# Patient Record
Sex: MALE | Race: BLACK OR AFRICAN AMERICAN | Employment: UNEMPLOYED | URBAN - METROPOLITAN AREA
[De-identification: names, ages, dates, MRNs, and addresses within clinical notes are randomized per-mention and may not be internally consistent; named-entity substitution may affect disease eponyms.]

---

## 2021-03-18 ENCOUNTER — APPOINTMENT (OUTPATIENT)
Dept: CT IMAGING | Age: 41
End: 2021-03-18
Payer: MEDICAID

## 2021-03-18 ENCOUNTER — HOSPITAL ENCOUNTER (OUTPATIENT)
Age: 41
Setting detail: OBSERVATION
Discharge: LEFT BEFORE DISCHARGE | End: 2021-03-19
Attending: STUDENT IN AN ORGANIZED HEALTH CARE EDUCATION/TRAINING PROGRAM | Admitting: INTERNAL MEDICINE
Payer: MEDICAID

## 2021-03-18 ENCOUNTER — APPOINTMENT (OUTPATIENT)
Dept: GENERAL RADIOLOGY | Age: 41
End: 2021-03-18
Payer: MEDICAID

## 2021-03-18 DIAGNOSIS — R20.2 ARM PARESTHESIA, LEFT: Primary | ICD-10-CM

## 2021-03-18 DIAGNOSIS — R42 DIZZINESS: ICD-10-CM

## 2021-03-18 PROBLEM — I20.8 ANGINAL EQUIVALENT (HCC): Status: ACTIVE | Noted: 2021-03-18

## 2021-03-18 LAB
A/G RATIO: 1.4 (ref 1.1–2.2)
ACETAMINOPHEN LEVEL: <5 UG/ML (ref 10–30)
ALBUMIN SERPL-MCNC: 4.3 G/DL (ref 3.4–5)
ALP BLD-CCNC: 55 U/L (ref 40–129)
ALT SERPL-CCNC: 23 U/L (ref 10–40)
AMPHETAMINE SCREEN, URINE: ABNORMAL
ANION GAP SERPL CALCULATED.3IONS-SCNC: 11 MMOL/L (ref 3–16)
AST SERPL-CCNC: 32 U/L (ref 15–37)
BARBITURATE SCREEN URINE: ABNORMAL
BASOPHILS ABSOLUTE: 0.1 K/UL (ref 0–0.2)
BASOPHILS RELATIVE PERCENT: 1 %
BENZODIAZEPINE SCREEN, URINE: ABNORMAL
BILIRUB SERPL-MCNC: 0.5 MG/DL (ref 0–1)
BILIRUBIN URINE: NEGATIVE
BLOOD, URINE: NEGATIVE
BUN BLDV-MCNC: 9 MG/DL (ref 7–20)
CALCIUM SERPL-MCNC: 8.9 MG/DL (ref 8.3–10.6)
CANNABINOID SCREEN URINE: POSITIVE
CHLORIDE BLD-SCNC: 102 MMOL/L (ref 99–110)
CLARITY: CLEAR
CO2: 23 MMOL/L (ref 21–32)
COCAINE METABOLITE SCREEN URINE: ABNORMAL
COLOR: YELLOW
CREAT SERPL-MCNC: 0.8 MG/DL (ref 0.9–1.3)
EOSINOPHILS ABSOLUTE: 0.1 K/UL (ref 0–0.6)
EOSINOPHILS RELATIVE PERCENT: 0.9 %
ETHANOL: NORMAL MG/DL (ref 0–0.08)
GFR AFRICAN AMERICAN: >60
GFR NON-AFRICAN AMERICAN: >60
GLOBULIN: 3.1 G/DL
GLUCOSE BLD-MCNC: 129 MG/DL (ref 70–99)
GLUCOSE BLD-MCNC: 143 MG/DL (ref 70–99)
GLUCOSE URINE: NEGATIVE MG/DL
HCT VFR BLD CALC: 42.7 % (ref 40.5–52.5)
HEMOGLOBIN: 14.4 G/DL (ref 13.5–17.5)
KETONES, URINE: NEGATIVE MG/DL
LACTIC ACID: 1.7 MMOL/L (ref 0.4–2)
LEUKOCYTE ESTERASE, URINE: NEGATIVE
LYMPHOCYTES ABSOLUTE: 3.2 K/UL (ref 1–5.1)
LYMPHOCYTES RELATIVE PERCENT: 24.1 %
Lab: ABNORMAL
MCH RBC QN AUTO: 34.1 PG (ref 26–34)
MCHC RBC AUTO-ENTMCNC: 33.7 G/DL (ref 31–36)
MCV RBC AUTO: 101.3 FL (ref 80–100)
METHADONE SCREEN, URINE: ABNORMAL
MICROSCOPIC EXAMINATION: NORMAL
MONOCYTES ABSOLUTE: 0.8 K/UL (ref 0–1.3)
MONOCYTES RELATIVE PERCENT: 6.4 %
NEUTROPHILS ABSOLUTE: 8.9 K/UL (ref 1.7–7.7)
NEUTROPHILS RELATIVE PERCENT: 67.6 %
NITRITE, URINE: NEGATIVE
OPIATE SCREEN URINE: ABNORMAL
OXYCODONE URINE: ABNORMAL
PDW BLD-RTO: 13 % (ref 12.4–15.4)
PERFORMED ON: ABNORMAL
PH UA: 7
PH UA: 7 (ref 5–8)
PHENCYCLIDINE SCREEN URINE: ABNORMAL
PLATELET # BLD: 354 K/UL (ref 135–450)
PMV BLD AUTO: 7 FL (ref 5–10.5)
POTASSIUM REFLEX MAGNESIUM: 3.8 MMOL/L (ref 3.5–5.1)
PROPOXYPHENE SCREEN: ABNORMAL
PROTEIN UA: NEGATIVE MG/DL
RBC # BLD: 4.21 M/UL (ref 4.2–5.9)
SALICYLATE, SERUM: <0.3 MG/DL (ref 15–30)
SARS-COV-2, NAAT: NOT DETECTED
SODIUM BLD-SCNC: 136 MMOL/L (ref 136–145)
SPECIFIC GRAVITY UA: >1.03 (ref 1–1.03)
TOTAL PROTEIN: 7.4 G/DL (ref 6.4–8.2)
TROPONIN: <0.01 NG/ML
URINE REFLEX TO CULTURE: NORMAL
URINE TYPE: NORMAL
UROBILINOGEN, URINE: 0.2 E.U./DL
WBC # BLD: 13.2 K/UL (ref 4–11)

## 2021-03-18 PROCEDURE — 71045 X-RAY EXAM CHEST 1 VIEW: CPT

## 2021-03-18 PROCEDURE — 70450 CT HEAD/BRAIN W/O DYE: CPT

## 2021-03-18 PROCEDURE — 99283 EMERGENCY DEPT VISIT LOW MDM: CPT

## 2021-03-18 PROCEDURE — 6360000004 HC RX CONTRAST MEDICATION: Performed by: PHYSICIAN ASSISTANT

## 2021-03-18 PROCEDURE — 70496 CT ANGIOGRAPHY HEAD: CPT

## 2021-03-18 PROCEDURE — 93005 ELECTROCARDIOGRAM TRACING: CPT | Performed by: PHYSICIAN ASSISTANT

## 2021-03-18 PROCEDURE — 83605 ASSAY OF LACTIC ACID: CPT

## 2021-03-18 PROCEDURE — G0378 HOSPITAL OBSERVATION PER HR: HCPCS

## 2021-03-18 PROCEDURE — 80179 DRUG ASSAY SALICYLATE: CPT

## 2021-03-18 PROCEDURE — 80143 DRUG ASSAY ACETAMINOPHEN: CPT

## 2021-03-18 PROCEDURE — 87635 SARS-COV-2 COVID-19 AMP PRB: CPT

## 2021-03-18 PROCEDURE — 85025 COMPLETE CBC W/AUTO DIFF WBC: CPT

## 2021-03-18 PROCEDURE — 82077 ASSAY SPEC XCP UR&BREATH IA: CPT

## 2021-03-18 PROCEDURE — 2580000003 HC RX 258: Performed by: INTERNAL MEDICINE

## 2021-03-18 PROCEDURE — 80307 DRUG TEST PRSMV CHEM ANLYZR: CPT

## 2021-03-18 PROCEDURE — 81003 URINALYSIS AUTO W/O SCOPE: CPT

## 2021-03-18 PROCEDURE — 80053 COMPREHEN METABOLIC PANEL: CPT

## 2021-03-18 PROCEDURE — 6370000000 HC RX 637 (ALT 250 FOR IP): Performed by: PHYSICIAN ASSISTANT

## 2021-03-18 PROCEDURE — 84484 ASSAY OF TROPONIN QUANT: CPT

## 2021-03-18 PROCEDURE — 6370000000 HC RX 637 (ALT 250 FOR IP): Performed by: INTERNAL MEDICINE

## 2021-03-18 RX ORDER — ASPIRIN 325 MG
325 TABLET ORAL ONCE
Status: COMPLETED | OUTPATIENT
Start: 2021-03-18 | End: 2021-03-18

## 2021-03-18 RX ORDER — ASPIRIN 81 MG/1
81 TABLET, CHEWABLE ORAL DAILY
Status: DISCONTINUED | OUTPATIENT
Start: 2021-03-19 | End: 2021-03-19 | Stop reason: HOSPADM

## 2021-03-18 RX ORDER — PROMETHAZINE HYDROCHLORIDE 25 MG/1
12.5 TABLET ORAL EVERY 6 HOURS PRN
Status: DISCONTINUED | OUTPATIENT
Start: 2021-03-18 | End: 2021-03-19 | Stop reason: HOSPADM

## 2021-03-18 RX ORDER — SODIUM CHLORIDE 0.9 % (FLUSH) 0.9 %
10 SYRINGE (ML) INJECTION PRN
Status: DISCONTINUED | OUTPATIENT
Start: 2021-03-18 | End: 2021-03-19 | Stop reason: HOSPADM

## 2021-03-18 RX ORDER — ATORVASTATIN CALCIUM 40 MG/1
40 TABLET, FILM COATED ORAL NIGHTLY
Status: DISCONTINUED | OUTPATIENT
Start: 2021-03-18 | End: 2021-03-19 | Stop reason: HOSPADM

## 2021-03-18 RX ORDER — POTASSIUM CHLORIDE 7.45 MG/ML
10 INJECTION INTRAVENOUS PRN
Status: DISCONTINUED | OUTPATIENT
Start: 2021-03-18 | End: 2021-03-19 | Stop reason: HOSPADM

## 2021-03-18 RX ORDER — MAGNESIUM SULFATE IN WATER 40 MG/ML
2000 INJECTION, SOLUTION INTRAVENOUS PRN
Status: DISCONTINUED | OUTPATIENT
Start: 2021-03-18 | End: 2021-03-19 | Stop reason: HOSPADM

## 2021-03-18 RX ORDER — SODIUM CHLORIDE 0.9 % (FLUSH) 0.9 %
10 SYRINGE (ML) INJECTION EVERY 12 HOURS SCHEDULED
Status: DISCONTINUED | OUTPATIENT
Start: 2021-03-18 | End: 2021-03-19 | Stop reason: HOSPADM

## 2021-03-18 RX ORDER — ONDANSETRON 2 MG/ML
4 INJECTION INTRAMUSCULAR; INTRAVENOUS EVERY 6 HOURS PRN
Status: DISCONTINUED | OUTPATIENT
Start: 2021-03-18 | End: 2021-03-19 | Stop reason: HOSPADM

## 2021-03-18 RX ADMIN — Medication 10 ML: at 20:58

## 2021-03-18 RX ADMIN — ASPIRIN 325 MG ORAL TABLET 325 MG: 325 PILL ORAL at 20:07

## 2021-03-18 RX ADMIN — IOPAMIDOL 75 ML: 755 INJECTION, SOLUTION INTRAVENOUS at 15:37

## 2021-03-18 RX ADMIN — ATORVASTATIN CALCIUM 40 MG: 40 TABLET, FILM COATED ORAL at 20:58

## 2021-03-18 ASSESSMENT — PAIN SCALES - GENERAL: PAINLEVEL_OUTOF10: 0

## 2021-03-18 NOTE — PROGRESS NOTES
Medication Reconciliation     List of medications patient is currently taking is complete. Source of information:   1. Conversation with patient at bedside  2. EPIC records        Notes regarding home medications:  1. Patient denies taking any RX, OTC, and herbal medications.     Otilia Apgar, Pharmacy Intern

## 2021-03-18 NOTE — ED NOTES
Called Stroke Team [phone] at 1523; Dr. Cinthia Martinez called back at [facility]2 [facility]  03/18/21 8963

## 2021-03-18 NOTE — ED PROVIDER NOTES
[address]       Pt Name: Donna Mcconnell  MRN: [id number]  [place] 1980  Date of evaluation: 3/18/2021  Provider: EMILEE Cobb    Shared Visit or Autonomous Visit:  I have seen and evaluated this patient with my supervising physician Jone Merino MD.      CHIEF COMPLAINT       Chief Complaint   Patient presents with    Extremity Weakness     left sided, pt c/o \"it feels crazy\" x 1 hour       HISTORY OF PRESENT ILLNESS  (Location/Symptom, Timing/Onset, Context/Setting, Quality, Duration, Modifying Factors, Severity.)   Donna Mcconnell is a 38 y.o. male who presents to the emergency department with complaints of dizziness and tingling sensation throughout the left arm. Patient says the symptoms began spontaneously about an hour before arrival in the emergency department. Says was a normal day before then, no traumatic injury. Admits to smoking marijuana earlier today but no other drug use. He says he can still feel things in his left arm, but it is tingling throughout, with diminished sensation. Denies symptoms in the left leg or in the right arm or leg. Says the dizziness is primarily when he gets up and tries to move, as he feels unbalanced, a little lightheaded. Denies any prior history of symptoms like this or any known medical problems. Denies any recent trauma. He says he is moving all the limbs normally. Denies confusion. No other complaints. Nursing Notes were reviewed and I agree. REVIEW OF SYSTEMS    (2-9 systems for level 4, 10 or more for level 5)     Constitutional:  Negative for fever, chills, unexpected weight change. HENT:  Negative for congestion, rhinorrhea, sneezing, sore throat, trouble swallowing. Eyes:  Negative for pain or visual disturbance. Respiratory:  Negative for cough, shortness of breath, wheezing, stridor. Cardiovascular: Positive for lightheadedness.   Negative for chest pain, palpitations, leg swelling. Gastrointestinal:  Negative for nausea, vomiting, abdominal pain, diarrhea, constipation, blood in stool. Genitourinary:  Negative for dysuria, hematuria, flank pain, groin pain. Musculoskeletal:  Negative for myalgias, arthralgias, neck pain or stiffness. Neurological: Positive for diminished sensation in the left arm, dizziness. Negative for seizures, syncope, focal weakness, numbness, headache. Psychiatric/Behavioral:  Negative for suicidal ideas, hallucinations, confusion. Except as noted above the remainder of the review of systems was reviewed and negative. PAST MEDICAL HISTORY         Diagnosis Date    Foot pain, right        SURGICAL HISTORY           Procedure Laterality Date    OTHER SURGICAL HISTORY Right 09/15/2016    LAPIDUS BUNIONECTOMY RIGHT FOOT              CURRENT MEDICATIONS       Previous Medications    No medications on file       ALLERGIES     Patient has no known allergies. FAMILY HISTORY     History reviewed. No pertinent family history. No family status information on file. SOCIAL HISTORY      reports that he has been smoking. He has never used smokeless tobacco. He reports that he does not drink alcohol or use drugs. PHYSICAL EXAM    (up to 7 for level 4, 8 or more for level 5)     ED Triage Vitals   BP Temp Temp Source Pulse Resp SpO2 Height Weight   03/18/21 1521 03/18/21 1549 03/18/21 1549 03/18/21 1521 03/18/21 1521 03/18/21 1521 -- 03/18/21 1549   (!) 154/86 98.3 °F (36.8 °C) Oral 54 20 92 %  198 lb 3.1 oz (89.9 kg)       Constitutional:  Appearing well-developed and well-nourished. No distress. HENT:  Normocephalic and atraumatic. Conjunctivae and EOM normal. PERRLA. Neck:  Normal range of motion. No tracheal deviation. Cardiovascular:  Normal rate, regular rhythm, normal heart sounds, intact distal pulses. Pulmonary/Chest:  Effort and breath sounds normal. No respiratory distress, wheezes or rales. Abdominal:  Soft.  Bowel sounds normal. No tenderness, distension, mass, rebound or guarding. Musculoskeletal:  Normal range of motion. No edema exhibited. Neurological:  Alert and oriented to person, place, and time. PERRLA. EOM normal bilaterally. Cranial facial musculature and sensation grossly intact. Finger to nose testing intact. Normal test of skew. Negative pronator drift. 5/5 strength bilaterally in upper extremities with resisted shoulder abduction and flexion. No truncal ataxia. Negative Romberg. Normal gait. Skin:  Skin is warm and dry. Notably diaphoretic. Psychiatric:  Normal mood, affect, behavior, judgment, thought content. DIAGNOSTIC RESULTS     RADIOLOGY:     Interpretation per the Radiologist below, if available at the time of this note:    XR CHEST PORTABLE   Final Result   No acute cardiopulmonary disease. CTA HEAD NECK W CONTRAST   Final Result   Unremarkable CTA of the head and neck. CT Head WO Contrast   Final Result   No acute intracranial abnormality.              LABS:  Labs Reviewed   CBC WITH AUTO DIFFERENTIAL - Abnormal; Notable for the following components:       Result Value    WBC 13.2 (*)     .3 (*)     MCH 34.1 (*)     Neutrophils Absolute 8.9 (*)     All other components within normal limits    Narrative:     Performed at:  [facility]  [address] [address]   Phone [phone]   COMPREHENSIVE METABOLIC PANEL W/ REFLEX TO MG FOR LOW K - Abnormal; Notable for the following components:    Glucose 129 (*)     CREATININE 0.8 (*)     All other components within normal limits    Narrative:     Performed at:  [facility]  [address] [place], [place] [address]   Phone [phone]   URINE DRUG SCREEN - Abnormal; Notable for the following components:    Cannabinoid Scrn, Ur POSITIVE (*)     All other components within normal limits    Narrative:     Performed at:  [facility]  [address] [facility] [place] [address]   Phone [phone]   ACETAMINOPHEN LEVEL - Abnormal; Notable for the following components:    Acetaminophen Level <5 (*)     All other components within normal limits    Narrative:     Performed at:  [facility]  [address], [place] [address]   Phone [phone]   SALICYLATE LEVEL - Abnormal; Notable for the following components:    Salicylate, Serum <8.1 (*)     All other components within normal limits    Narrative:     Performed at:  [facility]  [address] [place] [address]   Phone [phone]   POCT GLUCOSE - Abnormal; Notable for the following components:    POC Glucose 143 (*)     All other components within normal limits    Narrative:     Performed at:  [facility]  [address], [place] [address]   Phone [phone], RAPID    Narrative:     Performed at:  [facility]  [address], [facility] [place] [address]   Phone [phone]   URINE RT REFLEX TO CULTURE    Narrative:     Performed at:  [facility]  [address], [facility] [place] [address]   Phone [phone]   LACTIC ACID, PLASMA    Narrative:     Performed at:  [facility]  [address], [facility] [place] [address]   Phone [phone]   TROPONIN    Narrative:     Performed at:  [facility]  [address], [facility] [place] [address]   Phone [phone]   ETHANOL    Narrative:     Performed at:  [facility]  [facility] [address], [facility] [place] [address]   Phone [phone]       All other labs were within normal range or not returned as of this dictation.     EMERGENCY DEPARTMENT COURSE and DIFFERENTIAL DIAGNOSIS/MDM:   Vitals:    Vitals:    03/18/21 1800 03/18/21 1900 03/18/21 1915 03/18/21 1930   BP: (!) 161/127 (!) 145/103 (!) 160/98 (!) 154/102   Pulse: 61 56 57 58   Resp: 21 20 21 15   Temp:       TempSrc:       SpO2: 95% 98% 99% 100%   Weight:           The patient's condition in the ED was guarded. He was afebrile and nontoxic in appearance, but plainly diaphoretic upon presentation. Did not appear confused. Initial neurologic exam was normal other than for reported diminished sensation in the left arm. There was normal movement. Stroke alert was activated. I spoke with the Dr. Fidelia Boo, of the [place] stroke team, who advised that the patient be ambulated. While getting a CT scan, the patient vomited. I then ambulated him, and he was able to stand and take steps independently with no loss of balance. I reported this back to Dr. Jairon Chamorro, who was suspicious for an ischemic event but not disabling 1, and advised no TPA treatment. I received a call from radiology, reporting that initial CT of the head without contrast was normal.  Initial EKG showed a prolonged QT interval but no other concerning abnormalities. Patient was hypertensive in the ED, pressures as high as 154/102. ED work-up otherwise unremarkable. Patient is in need of hospital admission. I consulted the hospitalist, who agreed to accept and admit the patient. CRITICAL CARE: There was a high probability of clinically significant/life threatening deterioration in this patient's condition, which required my urgent intervention. Total critical care time was 40 minutes. This excludes any time for separately reportable procedures. PROCEDURES:  None    FINAL IMPRESSION      1. Arm paresthesia, left    2. Dizziness          DISPOSITION/PLAN   DISPOSITION Admitted 03/18/2021 07:48:40 PM      PATIENT REFERRED TO:  No follow-up provider specified.     DISCHARGE MEDICATIONS:  New Prescriptions    No medications on file       (Please note that portions of this note were completed with a voice recognition program.  Efforts were made to edit the dictations but occasionally words are mis-transcribed.)    Hemanth Collins, [address] [place] [address],[address] [place] [address]  03/18/21 2002

## 2021-03-18 NOTE — CONSULTS
Brief Stroke Phone Consultation:     I was contacted by the ED re: . Tino Rockwell. Briefly, Tamica Singh is a 38 y.o. male who presented with numbness to left arm and dizziness about one hour prior to arrival to the ED. While in the CT scanner, he vomited as well. In discussion with the PA,  Esteban Cook, the patient was able to ambulate without assistance. He had no dysphagia. NO disabling deficits at this time. Although he may be having an ischemic event, the deficits described are non disabling for now, tPA is not recommended. His CTA is negative for LVO, so endovascular therapy would not be indicated. Please call back with any questions or concerns.      Yanely Delcid MD MS   Stroke Team

## 2021-03-19 VITALS
BODY MASS INDEX: 30.75 KG/M2 | DIASTOLIC BLOOD PRESSURE: 92 MMHG | WEIGHT: 191.36 LBS | SYSTOLIC BLOOD PRESSURE: 152 MMHG | HEART RATE: 67 BPM | HEIGHT: 66 IN | TEMPERATURE: 98.9 F | RESPIRATION RATE: 16 BRPM | OXYGEN SATURATION: 100 %

## 2021-03-19 LAB
EKG ATRIAL RATE: 58 BPM
EKG ATRIAL RATE: 64 BPM
EKG DIAGNOSIS: NORMAL
EKG DIAGNOSIS: NORMAL
EKG P AXIS: 39 DEGREES
EKG P AXIS: 44 DEGREES
EKG P-R INTERVAL: 162 MS
EKG P-R INTERVAL: 180 MS
EKG Q-T INTERVAL: 430 MS
EKG Q-T INTERVAL: 474 MS
EKG QRS DURATION: 102 MS
EKG QRS DURATION: 108 MS
EKG QTC CALCULATION (BAZETT): 443 MS
EKG QTC CALCULATION (BAZETT): 465 MS
EKG R AXIS: -10 DEGREES
EKG R AXIS: -19 DEGREES
EKG T AXIS: 5 DEGREES
EKG T AXIS: 7 DEGREES
EKG VENTRICULAR RATE: 58 BPM
EKG VENTRICULAR RATE: 64 BPM
HCT VFR BLD CALC: 43.8 % (ref 40.5–52.5)
HEMOGLOBIN: 14.9 G/DL (ref 13.5–17.5)
MCH RBC QN AUTO: 34.3 PG (ref 26–34)
MCHC RBC AUTO-ENTMCNC: 34.1 G/DL (ref 31–36)
MCV RBC AUTO: 100.7 FL (ref 80–100)
PDW BLD-RTO: 13 % (ref 12.4–15.4)
PLATELET # BLD: 346 K/UL (ref 135–450)
PMV BLD AUTO: 6.8 FL (ref 5–10.5)
RBC # BLD: 4.35 M/UL (ref 4.2–5.9)
TROPONIN: <0.01 NG/ML
WBC # BLD: 11.4 K/UL (ref 4–11)

## 2021-03-19 PROCEDURE — 36415 COLL VENOUS BLD VENIPUNCTURE: CPT

## 2021-03-19 PROCEDURE — 85027 COMPLETE CBC AUTOMATED: CPT

## 2021-03-19 PROCEDURE — 93010 ELECTROCARDIOGRAM REPORT: CPT | Performed by: INTERNAL MEDICINE

## 2021-03-19 PROCEDURE — G0378 HOSPITAL OBSERVATION PER HR: HCPCS

## 2021-03-19 PROCEDURE — 84484 ASSAY OF TROPONIN QUANT: CPT

## 2021-03-19 PROCEDURE — 93005 ELECTROCARDIOGRAM TRACING: CPT | Performed by: INTERNAL MEDICINE

## 2021-03-19 NOTE — PROGRESS NOTES
Pt asked nurse to reach out to cardiology and testing to find out when he would be seen. Echo cannot be done until after noon, cardiology will not see him till afternoon. Explained to patient. Pt left AMA, for personal reasons at home. Pt states \"I have to leave here, I feel fine. \" explained to pt that AMA might result in insurance not covering bill. Pt verbalized understanding. Instructed pt to come back to emergency department if symptoms continue. Pt verbalized understanding. Provided patient with phone number to [facility] per Pawan Meza RN recommendation. PIV removed. Dressing in place. Pt walked to exit.

## 2021-03-19 NOTE — PROGRESS NOTES
H/p dictation id [id number]. Date of service 03/18/2021. Anginal equivalent vs tia. Obesity. Htn. Marijuana use.

## 2021-03-19 NOTE — H&P
is 10, pulse 61,  blood pressure 163/96, saturating 98%. CNS:  Alert, awake and oriented. PSYCH:  The patient is cooperative, answering questions appropriately. HEENT:  Eyes:  Pupils are reactive to light. ENT:  Extraocular muscle  movements are intact. RESPIRATORY:  No obvious rales, rubs, or rhonchi. CARDIOVASCULAR:  S1, S2 are heard. No murmurs, gallops, or rubs. ABDOMEN:  Nondistended. MUSCULOSKELETAL:  No acute obvious deformities. SKIN:  Appears hydrated without any obvious rashes or lesions. DIAGNOSTIC DATA:  Urine drug screen is positive for cannabis. Troponin  less than 0.01 x2. BUN 9, creatinine 0.8. Alcohol level not detected. White count of 13.2, hemoglobin of 14.4. CT of the head and neck showed unremarkable CTA findings. Chest x-ray  shows no acute anomaly. ASSESSMENT:  1. Angina equivalent. 2.  Obesity. 3.  Hypertension. 4.  Marijuana use. PLAN OF CARE:  The patient is admitted to Observation. Telemetry will  be continued. Cardiology consult was requested. Even though initial  thought was possible TIA, based on the patient's history, I do not  believe the patient had a TIA. This may more be a representative of an  anginal equivalent. However, we will also obtain a Neurology  consultation and follow their recommendations. NPO status after  midnight will be continued. Antiplatelet therapy will be continued. CODE STATUS:  Full. EXPECTED LENGTH OF STAY:  Less than two midnights based on the plan of  care above. DVT prophylaxis with Lovenox. RISK: High due to the patient's presentation with what appears to be an  anginal equivalent versus a TIA. DISPOSITION:  Observation Telemetry.         Bhargavi Ansari MD    D: 03/19/2021 2:02:25       T: 03/19/2021 3:35:15     [email or web address]  Job#: [id number]     Doc#: [id number]    CC:

## 2021-03-19 NOTE — PROGRESS NOTES
4 Eyes Skin Assessment     NAME:  Stefanie Taylor  YOB: 1980  MEDICAL RECORD NUMBER:  [id number]    The patient is being assess for  Admission    I agree that 2 RN's have performed a thorough Head to Toe Skin Assessment on the patient. ALL assessment sites listed below have been assessed. Areas assessed by both nurses:    Head, Face, Ears, Shoulders, Back, Chest, Arms, Elbows, Hands, Sacrum. Buttock, Coccyx, Ischium and Legs. Feet and Heels        Does the Patient have a Wound?  No noted wound(s)       Cl Prevention initiated:  No   Wound Care Orders initiated:  No    Pressure Injury (Stage 3,4, Unstageable, DTI, NWPT, and Complex wounds) if present place consult order under [de-identified] No    New and Established Ostomies if present place consult order under : No      Nurse 1 eSignature: Electronically signed by Glendy Bennett RN on 3/18/21 at 8:30 PM EDT    **SHARE this note so that the co-signing nurse is able to place an eSignature**    Nurse 2 eSignature: {Esignature:[id number]}

## 2021-03-19 NOTE — PROGRESS NOTES
Pt arrived to [phone] from ED via wheelchair. Pt is A/O x 4 and is independent. Pt BP is elevated on arrival, all other VSS. Pt denies c/o pain at this time. Skin assessment completed. Please see 4 eyes skin note and skin documentation. Bed locked in lowest position and call light Is within reach. Oriented to room, call light and hourly rounding. Will review orders and continue to monitor.      Fatuma Jauregui RN 3/18/2021 8:29 PM

## 2021-03-30 NOTE — PROGRESS NOTES
[facility]    Migel Dong  1980 March 31, 2021    Reason for Consult: Left arm numbness     CC: \"my left side went numb\"     HPI:  The patient is 38 y.o. male with no significant past medical history. He presents as referral from the ED after presenting with left arm numbness. The left arm and leg numbness presented with rest. The symptoms did resolve after a few hours. He has never experienced similar symptoms in the past. He denies feeling his heart racing or skipping beats at that time. He admits to personal stress at the time. He will work out and denies any exertional symptoms including chest pain or shortness of breath. He denies tobacco use but admits to daily marijuana use. He will have 4-5 shots of alcohol daily and states he has the ability to reduce this without difficulty. Review of Systems:  Constitutional: No fatigue, weakness, night sweats or fever. HEENT: No new vision difficulties or ringing in the ears. Respiratory: No new SOB, PND, orthopnea or cough. Cardiovascular: See HPI   GI: No n/v, diarrhea, constipation, abdominal pain or changes in bowel habits. No melena, no hematochezia  : No urinary frequency, urgency, incontinence, hematuria or dysuria. Skin: No cyanosis or skin lesions. Musculoskeletal: No new muscle or joint pain. Neurological: No syncope or TIA-like symptoms. Psychiatric: No anxiety, insomnia or depression     Past Medical History:   Diagnosis Date    Foot pain, right      Past Surgical History:   Procedure Laterality Date    OTHER SURGICAL HISTORY Right 09/15/2016    LAPIDUS BUNIONECTOMY RIGHT FOOT            History reviewed. No pertinent family history. Social History     Tobacco Use    Smoking status: Former Smoker    Smokeless tobacco: Never Used   Substance Use Topics    Alcohol use: No    Drug use: No       No Known Allergies  No current outpatient medications on file.      No current facility-administered structural abnormalities. Should he have any recurrence of these symptoms, I would have him complete an event monitor to rule out any arrhythmias. His physical exam was unremarkable. His symptoms could have been related to the stress he was experiencing at that time. He has some polysubstance abuse with alcohol but no amphetamines or cocaine to suggest uncontrolled hypertension. I will see him in the office for follow up pending test results. This note was scribed in the presence of Yaya Reilly MD by General Yazan RN.   trigeminal neuralgia

## 2021-03-31 ENCOUNTER — OFFICE VISIT (OUTPATIENT)
Dept: CARDIOLOGY CLINIC | Age: 41
End: 2021-03-31
Payer: MEDICAID

## 2021-03-31 VITALS
TEMPERATURE: 96.9 F | DIASTOLIC BLOOD PRESSURE: 78 MMHG | BODY MASS INDEX: 32.33 KG/M2 | WEIGHT: 201.2 LBS | HEIGHT: 66 IN | HEART RATE: 68 BPM | OXYGEN SATURATION: 98 % | SYSTOLIC BLOOD PRESSURE: 110 MMHG

## 2021-03-31 DIAGNOSIS — R20.0 LEFT ARM NUMBNESS: Primary | ICD-10-CM

## 2021-03-31 DIAGNOSIS — F10.10 ALCOHOL ABUSE: ICD-10-CM

## 2021-03-31 DIAGNOSIS — M79.602 LEFT ARM PAIN: ICD-10-CM

## 2021-03-31 PROCEDURE — 93000 ELECTROCARDIOGRAM COMPLETE: CPT | Performed by: INTERNAL MEDICINE

## 2021-03-31 PROCEDURE — 99244 OFF/OP CNSLTJ NEW/EST MOD 40: CPT | Performed by: INTERNAL MEDICINE

## 2023-12-31 ENCOUNTER — HOSPITAL ENCOUNTER (EMERGENCY)
Age: 43
Discharge: HOME | End: 2023-12-31
Attending: EMERGENCY MEDICINE

## 2023-12-31 VITALS
BODY MASS INDEX: 36.14 KG/M2 | HEART RATE: 71 BPM | OXYGEN SATURATION: 97 % | WEIGHT: 224.87 LBS | TEMPERATURE: 97.9 F | DIASTOLIC BLOOD PRESSURE: 83 MMHG | SYSTOLIC BLOOD PRESSURE: 128 MMHG | HEIGHT: 66 IN | RESPIRATION RATE: 18 BRPM

## 2023-12-31 DIAGNOSIS — H61.22 IMPACTED CERUMEN OF LEFT EAR: Primary | ICD-10-CM

## 2023-12-31 PROCEDURE — 6370000000 HC RX 637 (ALT 250 FOR IP): Performed by: EMERGENCY MEDICINE

## 2023-12-31 PROCEDURE — 99283 EMERGENCY DEPT VISIT LOW MDM: CPT

## 2023-12-31 RX ADMIN — CARBAMIDE PEROXIDE 6.5% 5 DROP: 6.5 LIQUID AURICULAR (OTIC) at 05:50

## 2023-12-31 NOTE — ED NOTES
Discharge and education instructions reviewed. Patient verbalized understanding, teach-back successful. Patient denied questions at this time. No acute distress noted. Patient instructed to follow-up as noted - return to emergency department if symptoms worsen. Patient verbalized understanding. Discharged per EDMD with discharge instructions.

## 2023-12-31 NOTE — ED TRIAGE NOTES
Patient to ED for cerumen impaction.  Patient is alert and oriented with GCS 15.  Patient complains of \"fullness\" of the left ear.  Patient has had prior impactions and this is a similar feeling to then.  Patient denies any other complaints at this time.

## 2023-12-31 NOTE — ED PROVIDER NOTES
I PERSONALLY SAW THE PATIENT AND PERFORMED A SUBSTANTIVE PORTION OF THE VISIT INCLUDING ALL ASPECTS OF THE MEDICAL DECISION MAKING PROCESS.    [facility] EMERGENCY DEPARTMENT  EMERGENCY DEPARTMENT ENCOUNTER      Pt Name: Migel Dong  MRN: [id number]  Birthdate 1980  Date of evaluation: 12/31/2023  Provider: Giovanni Naqvi MD    CHIEF COMPLAINT       Chief Complaint   Patient presents with    Cerumen Impaction     Fullness noted in left ear, has had impactions in the past, feels similar       HISTORY OF PRESENT ILLNESS    Migel Dong is a 43 y.o. male who presents to the emergency department with left ear pain.  Patient presents with left ear pain.  Has been going on last 24 hours.  History of cerumen impaction.  Patient presents with no other associated symptoms.    Nursing Notes were reviewed. Including nursing noted for FM, Surgical History, Past Medical History, Social History, vitals, and allergies; agree with all.     REVIEW OF SYSTEMS       Review of Systems    Except as noted above the remainder of the review of systems was reviewed and negative.     PAST MEDICAL HISTORY     Past Medical History:   Diagnosis Date    Foot pain, right        SURGICAL HISTORY       Past Surgical History:   Procedure Laterality Date    OTHER SURGICAL HISTORY Right 09/15/2016    LAPIDUS BUNIONECTOMY RIGHT FOOT              CURRENT MEDICATIONS       Previous Medications    No medications on file       ALLERGIES     Patient has no known allergies.    FAMILY HISTORY      History reviewed. No pertinent family history.    SOCIAL HISTORY       Social History     Socioeconomic History    Marital status: Single     Spouse name: None    Number of children: None    Years of education: None    Highest education level: None   Tobacco Use    Smoking status: Former    Smokeless tobacco: Never   Vaping Use    Vaping Use: Never used   Substance and Sexual Activity    Alcohol use: No    Drug use: Yes     Types: Marijuana